# Patient Record
Sex: FEMALE | Race: OTHER | HISPANIC OR LATINO | Employment: UNEMPLOYED | URBAN - METROPOLITAN AREA
[De-identification: names, ages, dates, MRNs, and addresses within clinical notes are randomized per-mention and may not be internally consistent; named-entity substitution may affect disease eponyms.]

---

## 2018-10-19 LAB — EXTERNAL HIV SCREEN: NORMAL

## 2019-11-11 LAB — N GONORRHOEA RRNA SPEC QL PROBE: NEGATIVE

## 2019-11-20 RX ORDER — NORGESTIMATE AND ETHINYL ESTRADIOL 0.25-0.035
1 KIT ORAL DAILY
COMMUNITY
Start: 2019-08-21 | End: 2022-02-17 | Stop reason: ALTCHOICE

## 2020-02-22 ENCOUNTER — HOSPITAL ENCOUNTER (EMERGENCY)
Facility: HOSPITAL | Age: 25
Discharge: HOME | End: 2020-02-22
Attending: EMERGENCY MEDICINE | Admitting: EMERGENCY MEDICINE
Payer: COMMERCIAL

## 2020-02-22 VITALS
HEART RATE: 105 BPM | RESPIRATION RATE: 18 BRPM | SYSTOLIC BLOOD PRESSURE: 129 MMHG | WEIGHT: 180.78 LBS | OXYGEN SATURATION: 98 % | DIASTOLIC BLOOD PRESSURE: 77 MMHG | TEMPERATURE: 99.9 F

## 2020-02-22 DIAGNOSIS — J06.9 VIRAL UPPER RESPIRATORY ILLNESS: Primary | ICD-10-CM

## 2020-02-22 LAB
FLUAV RNA NPH QL NAA+PROBE: NORMAL
FLUBV RNA NPH QL NAA+PROBE: NORMAL
RSV RNA NPH QL NAA+PROBE: NORMAL
S PYO DNA THROAT QL NAA+PROBE: NORMAL

## 2020-02-22 PROCEDURE — 87651 STREP A DNA AMP PROBE: CPT | Performed by: PHYSICIAN ASSISTANT

## 2020-02-22 PROCEDURE — 96372 THER/PROPH/DIAG INJ SC/IM: CPT

## 2020-02-22 PROCEDURE — 99283 EMERGENCY DEPT VISIT LOW MDM: CPT

## 2020-02-22 PROCEDURE — 99283 EMERGENCY DEPT VISIT LOW MDM: CPT | Performed by: PHYSICIAN ASSISTANT

## 2020-02-22 PROCEDURE — 87631 RESP VIRUS 3-5 TARGETS: CPT | Performed by: PHYSICIAN ASSISTANT

## 2020-02-22 RX ORDER — ONDANSETRON 4 MG/1
4 TABLET, FILM COATED ORAL EVERY 8 HOURS PRN
Qty: 12 TABLET | Refills: 0 | Status: SHIPPED | OUTPATIENT
Start: 2020-02-22 | End: 2020-03-03 | Stop reason: ALTCHOICE

## 2020-02-22 RX ORDER — CETIRIZINE HYDROCHLORIDE, PSEUDOEPHEDRINE HYDROCHLORIDE 5; 120 MG/1; MG/1
1 TABLET, FILM COATED, EXTENDED RELEASE ORAL 2 TIMES DAILY
Qty: 30 TABLET | Refills: 0 | Status: SHIPPED | OUTPATIENT
Start: 2020-02-22 | End: 2020-03-03 | Stop reason: ALTCHOICE

## 2020-02-22 RX ORDER — IBUPROFEN 600 MG/1
600 TABLET ORAL EVERY 6 HOURS PRN
Qty: 30 TABLET | Refills: 0 | Status: SHIPPED | OUTPATIENT
Start: 2020-02-22 | End: 2020-03-03 | Stop reason: ALTCHOICE

## 2020-02-22 RX ORDER — KETOROLAC TROMETHAMINE 30 MG/ML
30 INJECTION, SOLUTION INTRAMUSCULAR; INTRAVENOUS ONCE
Status: COMPLETED | OUTPATIENT
Start: 2020-02-22 | End: 2020-02-22

## 2020-02-22 RX ORDER — ONDANSETRON 4 MG/1
4 TABLET, ORALLY DISINTEGRATING ORAL ONCE
Status: COMPLETED | OUTPATIENT
Start: 2020-02-22 | End: 2020-02-22

## 2020-02-22 RX ADMIN — ONDANSETRON 4 MG: 4 TABLET, ORALLY DISINTEGRATING ORAL at 18:05

## 2020-02-22 RX ADMIN — KETOROLAC TROMETHAMINE 30 MG: 30 INJECTION, SOLUTION INTRAMUSCULAR at 18:05

## 2020-02-22 NOTE — ED PROVIDER NOTES
History  Chief Complaint   Patient presents with    Flu Symptoms     HA with N/V and "fever" no meds taken today  started yesterday     Patient is a very pleasant 79-year-old female with no significant past medical history who presents today for evaluation of headache that started about 2 days ago  The patient reports that she also has associated nausea and vomiting  She reports she is able tolerate water  She states she has not taken any medications today  She reports subjective fevers  No abdominal pain  No pain with urination  She does not report significant cough  Prior to Admission Medications   Prescriptions Last Dose Informant Patient Reported? Taking?   norgestimate-ethinyl estradiol (ORTHO-CYCLEN) 0 25-35 MG-MCG per tablet   Yes No   Sig: Take 1 tablet by mouth daily      Facility-Administered Medications: None       History reviewed  No pertinent past medical history  History reviewed  No pertinent surgical history  History reviewed  No pertinent family history  I have reviewed and agree with the history as documented  Social History     Tobacco Use    Smoking status: Never Smoker    Smokeless tobacco: Never Used   Substance Use Topics    Alcohol use: Never     Frequency: Never    Drug use: Never       Review of Systems   Constitutional: Positive for activity change, appetite change and fever  HENT: Negative  Eyes: Negative  Respiratory: Negative  Cardiovascular: Negative  Gastrointestinal: Positive for nausea and vomiting  Endocrine: Negative  Genitourinary: Negative  Musculoskeletal: Negative  Skin: Negative  Allergic/Immunologic: Negative  Neurological: Negative  Hematological: Negative  Psychiatric/Behavioral: Negative  Physical Exam  Physical Exam   Constitutional: She is oriented to person, place, and time  She appears well-developed and well-nourished  HENT:   Head: Normocephalic and atraumatic     Right Ear: External ear normal    Left Ear: External ear normal    Mouth/Throat: Oropharynx is clear and moist  No oropharyngeal exudate  Noted rhinorrhea present  Eyes: Conjunctivae are normal    Neck: Normal range of motion  Cardiovascular: Normal rate, regular rhythm and normal heart sounds  Pulmonary/Chest: Effort normal and breath sounds normal    Abdominal: Soft  She exhibits no distension  There is no tenderness  There is no guarding  Lymphadenopathy:     She has cervical adenopathy  Neurological: She is alert and oriented to person, place, and time  Skin: Skin is warm  Capillary refill takes less than 2 seconds  Psychiatric: She has a normal mood and affect   Her behavior is normal  Judgment and thought content normal        Vital Signs  ED Triage Vitals   Temperature Pulse Respirations Blood Pressure SpO2   02/22/20 1739 02/22/20 1739 02/22/20 1739 02/22/20 1739 02/22/20 1739   99 9 °F (37 7 °C) (!) 134 18 129/77 98 %      Temp Source Heart Rate Source Patient Position - Orthostatic VS BP Location FiO2 (%)   02/22/20 1739 02/22/20 1739 02/22/20 1739 02/22/20 1739 --   Tympanic Monitor Sitting Left arm       Pain Score       02/22/20 1805       5           Vitals:    02/22/20 1739 02/22/20 1854   BP: 129/77    Pulse: (!) 134 105   Patient Position - Orthostatic VS: Sitting          Visual Acuity      ED Medications  Medications   ketorolac (TORADOL) injection 30 mg (30 mg Intramuscular Given 2/22/20 1805)   ondansetron (ZOFRAN-ODT) dispersible tablet 4 mg (4 mg Oral Given 2/22/20 1805)       Diagnostic Studies  Results Reviewed     Procedure Component Value Units Date/Time    Influenza A/B and RSV PCR [[id number]]  (Normal) Collected:  02/22/20 1757    Lab Status:  Final result Specimen:  Nares from Nose Updated:  02/22/20 1849     INFLUENZA A PCR None Detected     INFLUENZA B PCR None Detected     RSV PCR None Detected    Strep A PCR [[id number]]  (Normal) Collected:  02/22/20 1757    Lab Status:  Final result Specimen:  Throat Updated:  02/22/20 1848     STREP A PCR None Detected                 No orders to display              Procedures  Procedures         ED Course                               MDM  Number of Diagnoses or Management Options  Viral upper respiratory illness:   Diagnosis management comments: Patient reports significant symptom improvement with administration of Toradol and Zofran  She is able to tolerate p o  Intake  Influenza screen and strep a screen negative  Patient discharged home with recommendations to continue supportive care  Disposition  Final diagnoses:   Viral upper respiratory illness     Time reflects when diagnosis was documented in both MDM as applicable and the Disposition within this note     Time User Action Codes Description Comment    2/22/2020  6:51 PM Jazmine Wakefield Add [J06 9] Viral upper respiratory illness       ED Disposition     ED Disposition Condition Date/Time Comment    Discharge Stable Sat Feb 22, 2020  6:51 PM Delores Mari discharge to home/self care              Follow-up Information     Follow up With Specialties Details Why Live Wild MD Obstetrics and Gynecology, Obstetrics, Gynecology Schedule an appointment as soon as possible for a visit  If symptoms worsen [address] [place] [address]  [phone]            Discharge Medication List as of 2/22/2020  6:52 PM      START taking these medications    Details   cetirizine-pseudoephedrine (ZyrTEC-D) 5-120 MG per tablet Take 1 tablet by mouth 2 (two) times a day, Starting Sat 2/22/2020, Print      ibuprofen (MOTRIN) 600 mg tablet Take 1 tablet (600 mg total) by mouth every 6 (six) hours as needed for moderate pain, Starting Sat 2/22/2020, Print      ondansetron (ZOFRAN) 4 mg tablet Take 1 tablet (4 mg total) by mouth every 8 (eight) hours as needed for vomiting, Starting Sat 2/22/2020, Print         CONTINUE these medications which have NOT CHANGED    Details norgestimate-ethinyl estradiol (ORTHO-CYCLEN) 0 25-35 MG-MCG per tablet Take 1 tablet by mouth daily, Starting Wed 8/21/2019, Until Thu 8/20/2020, Historical Med           No discharge procedures on file      PDMP Review     None          ED Provider  Electronically Signed by           Mahad Yadav PA-C  02/22/20 2616

## 2020-02-28 RX ORDER — DOCUSATE SODIUM 100 MG/1
100 CAPSULE, LIQUID FILLED ORAL 2 TIMES DAILY
COMMUNITY
Start: 2019-02-07 | End: 2020-03-03 | Stop reason: ALTCHOICE

## 2020-03-03 ENCOUNTER — OFFICE VISIT (OUTPATIENT)
Dept: FAMILY MEDICINE CLINIC | Facility: CLINIC | Age: 25
End: 2020-03-03
Payer: COMMERCIAL

## 2020-03-03 ENCOUNTER — TELEPHONE (OUTPATIENT)
Dept: ADMINISTRATIVE | Facility: OTHER | Age: 25
End: 2020-03-03

## 2020-03-03 VITALS
BODY MASS INDEX: 32.32 KG/M2 | HEART RATE: 94 BPM | WEIGHT: 182.4 LBS | TEMPERATURE: 98.6 F | SYSTOLIC BLOOD PRESSURE: 130 MMHG | HEIGHT: 63 IN | OXYGEN SATURATION: 98 % | DIASTOLIC BLOOD PRESSURE: 90 MMHG

## 2020-03-03 DIAGNOSIS — Z23 NEED FOR HPV VACCINATION: ICD-10-CM

## 2020-03-03 DIAGNOSIS — K64.9 HEMORRHOIDS, UNSPECIFIED HEMORRHOID TYPE: ICD-10-CM

## 2020-03-03 DIAGNOSIS — Z13.220 SCREENING FOR CHOLESTEROL LEVEL: ICD-10-CM

## 2020-03-03 DIAGNOSIS — Z00.01 ENCOUNTER FOR WELL ADULT EXAM WITH ABNORMAL FINDINGS: ICD-10-CM

## 2020-03-03 DIAGNOSIS — R10.9 ABDOMINAL PAIN, UNSPECIFIED ABDOMINAL LOCATION: ICD-10-CM

## 2020-03-03 DIAGNOSIS — Z12.4 PAP SMEAR FOR CERVICAL CANCER SCREENING: ICD-10-CM

## 2020-03-03 DIAGNOSIS — Z13.1 ENCOUNTER FOR SCREENING EXAMINATION FOR IMPAIRED GLUCOSE REGULATION AND DIABETES MELLITUS: ICD-10-CM

## 2020-03-03 DIAGNOSIS — Z11.3 SCREENING FOR STD (SEXUALLY TRANSMITTED DISEASE): ICD-10-CM

## 2020-03-03 DIAGNOSIS — R31.9 HEMATURIA, UNSPECIFIED TYPE: ICD-10-CM

## 2020-03-03 DIAGNOSIS — R30.0 DYSURIA: Primary | ICD-10-CM

## 2020-03-03 DIAGNOSIS — Z11.4 ENCOUNTER FOR SCREENING FOR HIV: ICD-10-CM

## 2020-03-03 LAB
SL AMB  POCT GLUCOSE, UA: ABNORMAL
SL AMB LEUKOCYTE ESTERASE,UA: ABNORMAL
SL AMB POCT BILIRUBIN,UA: ABNORMAL
SL AMB POCT BLOOD,UA: ABNORMAL
SL AMB POCT CLARITY,UA: ABNORMAL
SL AMB POCT COLOR,UA: YELLOW
SL AMB POCT KETONES,UA: ABNORMAL
SL AMB POCT NITRITE,UA: ABNORMAL
SL AMB POCT PH,UA: 5
SL AMB POCT SPECIFIC GRAVITY,UA: 1.02
SL AMB POCT URINE PROTEIN: ABNORMAL
SL AMB POCT UROBILINOGEN: 0.2

## 2020-03-03 PROCEDURE — 87077 CULTURE AEROBIC IDENTIFY: CPT | Performed by: NURSE PRACTITIONER

## 2020-03-03 PROCEDURE — 1036F TOBACCO NON-USER: CPT | Performed by: NURSE PRACTITIONER

## 2020-03-03 PROCEDURE — 87186 SC STD MICRODIL/AGAR DIL: CPT | Performed by: NURSE PRACTITIONER

## 2020-03-03 PROCEDURE — 90471 IMMUNIZATION ADMIN: CPT | Performed by: NURSE PRACTITIONER

## 2020-03-03 PROCEDURE — 81002 URINALYSIS NONAUTO W/O SCOPE: CPT | Performed by: NURSE PRACTITIONER

## 2020-03-03 PROCEDURE — 90651 9VHPV VACCINE 2/3 DOSE IM: CPT | Performed by: NURSE PRACTITIONER

## 2020-03-03 PROCEDURE — 87086 URINE CULTURE/COLONY COUNT: CPT | Performed by: NURSE PRACTITIONER

## 2020-03-03 PROCEDURE — 3008F BODY MASS INDEX DOCD: CPT | Performed by: NURSE PRACTITIONER

## 2020-03-03 PROCEDURE — 99203 OFFICE O/P NEW LOW 30 MIN: CPT | Performed by: NURSE PRACTITIONER

## 2020-03-03 RX ORDER — NITROFURANTOIN 25; 75 MG/1; MG/1
100 CAPSULE ORAL 2 TIMES DAILY
Qty: 10 CAPSULE | Refills: 0 | Status: SHIPPED | OUTPATIENT
Start: 2020-03-03 | End: 2020-03-08

## 2020-03-03 NOTE — ASSESSMENT & PLAN NOTE
-POCT with leukocytes and burning on urination  I am starting patient on antibiotics today  Advised to increase fluid intake  Practice good hygiene  Use of cotton underwear and urinate post sexual intercourse  Sending out Urine culture

## 2020-03-03 NOTE — TELEPHONE ENCOUNTER
----- Message from Monique Velazquez, Em Caputo sent at 3/3/2020  2:17 PM EST -----  Regarding: hiv   03/03/20 2:17 PM    Jeanie, our patient Yanique Carey has had HIV completed/performed  Please assist in updating the patient chart by pulling the Care Everywhere (CE) document  The date of service is 10/19/18       Thank you,  Monique Velazquez MA   [address]

## 2020-03-03 NOTE — TELEPHONE ENCOUNTER
----- Message from Deandre Richardson, [address] Caputo sent at 3/3/2020  2:18 PM EST -----  03/03/20 2:18 PM    Hello, our patient Pierre Spain has had Chlamydia completed/performed  Please assist in updating the patient chart by pulling the Care Everywhere (CE) document  The date of service is 11/11/19       Thank you,  Deandre Richardson MA   [address]

## 2020-03-03 NOTE — PROGRESS NOTES
Assessment/Plan:    Dysuria  -POCT with leukocytes and burning on urination  I am starting patient on antibiotics today  Advised to increase fluid intake  Practice good hygiene  Use of cotton underwear and urinate post sexual intercourse  Sending out Urine culture  Hematuria  -Ordering US kidney    Hemorrhoids  I also advised to increase fluid intake to 2 L/day and incorporated more prunes in diet  I also am recommending patient exercise more frequently and increase fiber in diet  Avoid straining during bowel movements or heavy lifting  Practice good body mechanics  Rx for Anusol HC 2 5% given to patient today  To follow up if s/s do not resolve  BMI Counseling: Body mass index is 32 31 kg/m²  The BMI is above normal  Nutrition recommendations include reducing portion sizes, 3-5 servings of fruits/vegetables daily, decreasing soda and/or juice intake and moderation in carbohydrate intake  Exercise recommendations include moderate aerobic physical activity for 150 minutes/week  Diagnoses and all orders for this visit:    Dysuria  -     POCT urine dip  -     nitrofurantoin (MACROBID) 100 mg capsule; Take 1 capsule (100 mg total) by mouth 2 (two) times a day for 5 days  -     Urine culture    Hematuria, unspecified type  -     US retroperitoneal complete; Future    Hemorrhoids, unspecified hemorrhoid type  -     hydrocortisone (ANUSOL-HC, PROCTOSOL HC,) 2 5 % rectal cream; Insert into the rectum 2 (two) times a day  -     witch hazel-glycerin (TUCKS) topical pad; Insert 1 pad into the rectum as needed for irritation or hemorrhoids    Encounter for screening for HIV    Pap smear for cervical cancer screening  -     Ambulatory referral to Obstetrics / Gynecology; Future    Screening for cholesterol level  -     Lipid Panel with Direct LDL reflex; Future    Encounter for screening examination for impaired glucose regulation and diabetes mellitus  -     Comprehensive metabolic panel;  Future    Encounter for well adult exam with abnormal findings  -     CBC and differential; Future    Abdominal pain, unspecified abdominal location  -     POCT urine dip    Screening for STD (sexually transmitted disease)  -     Chlamydia/GC amplified DNA by PCR  -     Hepatitis panel, acute  -     RPR  -     Cancel: Molecular Vaginal Panel    Other orders  -     Discontinue: docusate sodium (Colace) 100 mg capsule; Take 100 mg by mouth 2 (two) times a day  -     Cancel: TSH, 3rd generation with Free T4 reflex; Future  -     Cancel: Chlamydia/GC amplified DNA by PCR; Future  -     Cancel: Ambulatory referral to Obstetrics / Gynecology; Future  -     Cancel: HIV 1/2 AG-AB combo; Future  -     Cancel: Sureswab(R) Bacterial Vaginosis DNA, Qn, PCR; Future          Subjective:      Patient ID: Fran Swain is a 27 y o  female  27year old female patient here to establish care  PMHx  States after having vaginal delivery had hemorrhoids  States when stooling she has bleeding from her rectum  States she is seeing blood in the toilet and on the paper when cleaning  Denies any itching in the anal area  Has not felt any hemorrhoids in the rectum  Urinary Frequency    This is a recurrent problem  The current episode started more than 1 year ago  The problem occurs intermittently  The problem has been waxing and waning  The quality of the pain is described as burning  The pain is at a severity of 7/10  The pain is moderate  There has been no fever  The fever has been present for less than 1 day  She is sexually active  There is no history of pyelonephritis  Associated symptoms include frequency and urgency  Pertinent negatives include no discharge, nausea or possible pregnancy  She has tried nothing for the symptoms  The treatment provided no relief  There is no history of catheterization, kidney stones, recurrent UTIs, a single kidney, urinary stasis or a urological procedure         The following portions of the patient's history were reviewed and updated as appropriate: allergies, current medications, past family history, past medical history, past social history, past surgical history and problem list     Review of Systems   Constitutional: Negative  Negative for appetite change, fatigue and fever  HENT: Negative  Eyes: Negative  Respiratory: Negative  Negative for cough, chest tightness, shortness of breath and wheezing  Cardiovascular: Negative  Negative for chest pain and palpitations  Gastrointestinal: Negative  Negative for nausea  Endocrine: Negative  Genitourinary: Positive for frequency and urgency  Musculoskeletal: Negative  Negative for arthralgias and gait problem  Skin: Negative  Allergic/Immunologic: Negative  Neurological: Negative  Negative for dizziness and headaches  Hematological: Negative  Negative for adenopathy  Does not bruise/bleed easily  Psychiatric/Behavioral: Negative  Objective:      /90 (BP Location: Left arm, Patient Position: Sitting, Cuff Size: Adult)   Pulse 94   Temp 98 6 °F (37 °C) (Oral)   Ht 5' 3" (1 6 m)   Wt 82 7 kg (182 lb 6 4 oz)   LMP 02/08/2020 (Exact Date)   SpO2 98%   BMI 32 31 kg/m²          Physical Exam   Constitutional: She is oriented to person, place, and time  She appears well-developed and well-nourished  No distress  HENT:   Head: Normocephalic and atraumatic  Right Ear: External ear normal    Left Ear: External ear normal    Eyes: Pupils are equal, round, and reactive to light  EOM are normal    Neck: Normal range of motion  Neck supple  Cardiovascular: Normal rate, regular rhythm and normal heart sounds  Pulmonary/Chest: Effort normal and breath sounds normal  No respiratory distress  She has no wheezes  She has no rales  Abdominal: Soft  Bowel sounds are normal  She exhibits no distension  There is no tenderness  There is no rigidity, no rebound, no guarding, no CVA tenderness and negative Matthews's sign  Genitourinary: Rectal exam shows external hemorrhoid  Pelvic exam was performed with patient prone  Musculoskeletal: Normal range of motion  Lymphadenopathy:     She has no cervical adenopathy  Neurological: She is alert and oriented to person, place, and time  She has normal reflexes  Skin: Skin is warm and dry  Capillary refill takes less than 2 seconds  She is not diaphoretic  Psychiatric: She has a normal mood and affect  Her behavior is normal    Nursing note and vitals reviewed

## 2020-03-03 NOTE — TELEPHONE ENCOUNTER
----- Message from Donny Velazquez sent at 3/3/2020  2:20 PM EST -----  Regarding: pap  03/03/20 2:20 PM    Hello, our patient Pierre Spain has had Pap Smear (HPV) aka Cervical Cancer Screening completed/performed  Please assist in updating the patient chart by pulling the Care Everywhere (CE) document  The date of service is 10/18/18       Thank you,  Deandre Richardson MA   [address] [facility] [address]

## 2020-03-03 NOTE — TELEPHONE ENCOUNTER
Upon review of the In Basket request we were able to locate, review, and update the patient chart as requested for Chlamydia, HIV and Pap Smear (HPV) aka Cervical Cancer Screening  Any additional questions or concerns should be emailed to the Practice Liaisons via [email or web address] email, please do not reply via In Basket      Thank you  oRshan Heller

## 2020-03-03 NOTE — ASSESSMENT & PLAN NOTE
I also advised to increase fluid intake to 2 L/day and incorporated more prunes in diet  I also am recommending patient exercise more frequently and increase fiber in diet  Avoid straining during bowel movements or heavy lifting  Practice good body mechanics  Rx for Anusol HC 2 5% given to patient today  To follow up if s/s do not resolve

## 2020-03-04 ENCOUNTER — HOSPITAL ENCOUNTER (OUTPATIENT)
Dept: ULTRASOUND IMAGING | Facility: HOSPITAL | Age: 25
Discharge: HOME | End: 2020-03-04
Payer: COMMERCIAL

## 2020-03-04 DIAGNOSIS — R31.9 HEMATURIA, UNSPECIFIED TYPE: ICD-10-CM

## 2020-03-04 PROCEDURE — 76770 US EXAM ABDO BACK WALL COMP: CPT

## 2020-03-06 LAB
BACTERIA UR CULT: ABNORMAL
BACTERIA UR CULT: ABNORMAL

## 2020-03-07 ENCOUNTER — APPOINTMENT (OUTPATIENT)
Dept: LAB | Facility: HOSPITAL | Age: 25
End: 2020-03-07
Payer: COMMERCIAL

## 2020-03-07 DIAGNOSIS — Z13.1 ENCOUNTER FOR SCREENING EXAMINATION FOR IMPAIRED GLUCOSE REGULATION AND DIABETES MELLITUS: ICD-10-CM

## 2020-03-07 DIAGNOSIS — Z00.01 ENCOUNTER FOR WELL ADULT EXAM WITH ABNORMAL FINDINGS: ICD-10-CM

## 2020-03-07 DIAGNOSIS — Z13.220 SCREENING FOR CHOLESTEROL LEVEL: ICD-10-CM

## 2020-03-07 LAB
ALBUMIN SERPL BCP-MCNC: 4.3 G/DL (ref 3–5.2)
ALP SERPL-CCNC: 67 U/L (ref 43–122)
ALT SERPL W P-5'-P-CCNC: 29 U/L (ref 9–52)
ANION GAP SERPL CALCULATED.3IONS-SCNC: 7 MMOL/L (ref 5–14)
AST SERPL W P-5'-P-CCNC: 22 U/L (ref 14–36)
BASOPHILS # BLD AUTO: 0.1 THOUSANDS/ΜL (ref 0–0.1)
BASOPHILS NFR BLD AUTO: 1 % (ref 0–1)
BILIRUB SERPL-MCNC: 1.1 MG/DL
BUN SERPL-MCNC: 13 MG/DL (ref 5–25)
CALCIUM SERPL-MCNC: 9.3 MG/DL (ref 8.4–10.2)
CHLORIDE SERPL-SCNC: 106 MMOL/L (ref 97–108)
CHOLEST SERPL-MCNC: 168 MG/DL
CO2 SERPL-SCNC: 28 MMOL/L (ref 22–30)
CREAT SERPL-MCNC: 0.82 MG/DL (ref 0.6–1.2)
EOSINOPHIL # BLD AUTO: 0.3 THOUSAND/ΜL (ref 0–0.4)
EOSINOPHIL NFR BLD AUTO: 4 % (ref 0–6)
ERYTHROCYTE [DISTWIDTH] IN BLOOD BY AUTOMATED COUNT: 12.8 %
GFR SERPL CREATININE-BSD FRML MDRD: 102 ML/MIN/1.73SQ M
GLUCOSE P FAST SERPL-MCNC: 87 MG/DL (ref 70–99)
HAV IGM SER QL: NORMAL
HBV CORE IGM SER QL: NORMAL
HBV SURFACE AG SER QL: NORMAL
HCT VFR BLD AUTO: 38.8 % (ref 36–46)
HCV AB SER QL: NORMAL
HDLC SERPL-MCNC: 40 MG/DL
HGB BLD-MCNC: 13.1 G/DL (ref 12–16)
LDLC SERPL CALC-MCNC: 107 MG/DL
LYMPHOCYTES # BLD AUTO: 1.9 THOUSANDS/ΜL (ref 0.5–4)
LYMPHOCYTES NFR BLD AUTO: 32 % (ref 25–45)
MCH RBC QN AUTO: 29.9 PG (ref 26–34)
MCHC RBC AUTO-ENTMCNC: 33.8 G/DL (ref 31–36)
MCV RBC AUTO: 88 FL (ref 80–100)
MONOCYTES # BLD AUTO: 0.5 THOUSAND/ΜL (ref 0.2–0.9)
MONOCYTES NFR BLD AUTO: 8 % (ref 1–10)
NEUTROPHILS # BLD AUTO: 3.4 THOUSANDS/ΜL (ref 1.8–7.8)
NEUTS SEG NFR BLD AUTO: 55 % (ref 45–65)
PLATELET # BLD AUTO: 387 THOUSANDS/UL (ref 150–450)
PMV BLD AUTO: 8 FL (ref 8.9–12.7)
POTASSIUM SERPL-SCNC: 4.2 MMOL/L (ref 3.6–5)
PROT SERPL-MCNC: 7.9 G/DL (ref 5.9–8.4)
RBC # BLD AUTO: 4.39 MILLION/UL (ref 4–5.2)
SODIUM SERPL-SCNC: 141 MMOL/L (ref 137–147)
TRIGL SERPL-MCNC: 103 MG/DL
WBC # BLD AUTO: 6.1 THOUSAND/UL (ref 4.5–11)

## 2020-03-07 PROCEDURE — 80061 LIPID PANEL: CPT

## 2020-03-07 PROCEDURE — 85025 COMPLETE CBC W/AUTO DIFF WBC: CPT

## 2020-03-07 PROCEDURE — 36415 COLL VENOUS BLD VENIPUNCTURE: CPT | Performed by: NURSE PRACTITIONER

## 2020-03-07 PROCEDURE — 87591 N.GONORRHOEAE DNA AMP PROB: CPT | Performed by: NURSE PRACTITIONER

## 2020-03-07 PROCEDURE — 80053 COMPREHEN METABOLIC PANEL: CPT

## 2020-03-07 PROCEDURE — 87491 CHLMYD TRACH DNA AMP PROBE: CPT | Performed by: NURSE PRACTITIONER

## 2020-03-07 PROCEDURE — 80074 ACUTE HEPATITIS PANEL: CPT | Performed by: NURSE PRACTITIONER

## 2020-03-07 PROCEDURE — 86592 SYPHILIS TEST NON-TREP QUAL: CPT | Performed by: NURSE PRACTITIONER

## 2020-03-09 ENCOUNTER — TELEPHONE (OUTPATIENT)
Dept: FAMILY MEDICINE CLINIC | Facility: CLINIC | Age: 25
End: 2020-03-09

## 2020-03-09 LAB
C TRACH DNA SPEC QL NAA+PROBE: NEGATIVE
N GONORRHOEA DNA SPEC QL NAA+PROBE: NEGATIVE
RPR SER QL: NORMAL

## 2020-03-09 NOTE — TELEPHONE ENCOUNTER
Conrad from radiology at [facility] called with significant finding In patients us please review

## 2020-03-11 DIAGNOSIS — N28.1 ACQUIRED COMPLEX CYST OF KIDNEY: Primary | ICD-10-CM

## 2020-04-12 ENCOUNTER — HOSPITAL ENCOUNTER (OUTPATIENT)
Dept: MRI IMAGING | Facility: HOSPITAL | Age: 25
Discharge: HOME | End: 2020-04-12
Payer: COMMERCIAL

## 2020-04-12 DIAGNOSIS — N28.1 ACQUIRED COMPLEX CYST OF KIDNEY: ICD-10-CM

## 2020-04-12 PROCEDURE — A9585 GADOBUTROL INJECTION: HCPCS | Performed by: NURSE PRACTITIONER

## 2020-04-12 PROCEDURE — 74183 MRI ABD W/O CNTR FLWD CNTR: CPT

## 2020-04-12 RX ADMIN — GADOBUTROL 8 ML: 604.72 INJECTION INTRAVENOUS at 10:51

## 2020-09-22 ENCOUNTER — TELEMEDICINE (OUTPATIENT)
Dept: FAMILY MEDICINE CLINIC | Facility: CLINIC | Age: 25
End: 2020-09-22
Payer: COMMERCIAL

## 2020-09-22 DIAGNOSIS — Z20.822 EXPOSURE TO COVID-19 VIRUS: Primary | ICD-10-CM

## 2020-09-22 DIAGNOSIS — Z20.822 EXPOSURE TO COVID-19 VIRUS: ICD-10-CM

## 2020-09-22 DIAGNOSIS — R19.7 DIARRHEA, UNSPECIFIED TYPE: ICD-10-CM

## 2020-09-22 DIAGNOSIS — R11.0 NAUSEA: ICD-10-CM

## 2020-09-22 PROCEDURE — U0003 INFECTIOUS AGENT DETECTION BY NUCLEIC ACID (DNA OR RNA); SEVERE ACUTE RESPIRATORY SYNDROME CORONAVIRUS 2 (SARS-COV-2) (CORONAVIRUS DISEASE [COVID-19]), AMPLIFIED PROBE TECHNIQUE, MAKING USE OF HIGH THROUGHPUT TECHNOLOGIES AS DESCRIBED BY CMS-2020-01-R: HCPCS | Performed by: NURSE PRACTITIONER

## 2020-09-22 PROCEDURE — 99213 OFFICE O/P EST LOW 20 MIN: CPT | Performed by: NURSE PRACTITIONER

## 2020-09-22 RX ORDER — LOPERAMIDE HYDROCHLORIDE 2 MG/1
2 CAPSULE ORAL 4 TIMES DAILY PRN
Qty: 30 CAPSULE | Refills: 0 | Status: SHIPPED | OUTPATIENT
Start: 2020-09-22

## 2020-09-22 RX ORDER — ONDANSETRON 8 MG/1
8 TABLET, ORALLY DISINTEGRATING ORAL EVERY 8 HOURS PRN
Qty: 20 TABLET | Refills: 0 | Status: SHIPPED | OUTPATIENT
Start: 2020-09-22 | End: 2020-09-29

## 2020-09-22 NOTE — ASSESSMENT & PLAN NOTE
We discussed the main goals of treatment are to prevent and treat volume depletion, maintain nutrition, and reduce spread to other people  I recommended patient keep hydrated and increase fluid intake like Gatorade  I also advised to avoid fluids high in sugar to avoid worsening diarrhea  I explained to patient that routine use of antibiotics is not recommended  Practice of good hygiene and disinfecting home as this can easily spread  Anti-diarrheal medications prescribed to patient  If s/s do not improve I advised to return to clinic

## 2020-09-22 NOTE — PROGRESS NOTES
Virtual Regular Visit      Assessment/Plan:    Problem List Items Addressed This Visit        Other    Exposure to COVID-19 virus - Primary     I advised patient that they  should quarantine 14 days and stay in their home and have contact with as few people as possible   They should stay home unless medically necessary  They  should not attend school/work with other people or public gatherings  A medical letter will be provided to patient for time off  Employers have been advised to be considerate if an employee needs to be quarantined  Patient was recommended not to attend gatherings, meetings or areas where people come together  They also should not have any visitors while on quarantine to avoid spread if suspicion of COVID  Patient should monitor their symptoms and take their temperature everyday and report any fever or new symptoms to PCP  Patient can take OTC medications as patient has like Tylenol and practice conservative measures while at home in quarantine  Stressed importance of good hand hygiene practices and avoid sharing any personal items with other household members  Practicing good disinfection as well  Pt in agreement  Relevant Orders    Novel Coronavirus (COVID-19), PCR [facility] - Collected at [employer] or [facility]    Diarrhea     We discussed the main goals of treatment are to prevent and treat volume depletion, maintain nutrition, and reduce spread to other people  I recommended patient keep hydrated and increase fluid intake like Gatorade  I also advised to avoid fluids high in sugar to avoid worsening diarrhea  I explained to patient that routine use of antibiotics is not recommended  Practice of good hygiene and disinfecting home as this can easily spread  Anti-diarrheal medications prescribed to patient  If s/s do not improve I advised to return to clinic           Relevant Medications    loperamide (IMODIUM) 2 mg capsule    Nausea    Relevant Medications    ondansetron (ZOFRAN-ODT) 8 mg disintegrating tablet               Reason for visit is   Chief Complaint   Patient presents with    Virtual Regular Visit        Encounter provider SOFIA Quinones    Provider [facility] at [facility] AT [address]  [address] [place] [address]      Recent Visits  No visits were found meeting these conditions  Showing recent visits within past 7 days and meeting all other requirements     Today's Visits  Date Type Provider Dept   09/22/20 Telemedicine Liliana Mcfarland, [address] today's visits and meeting all other requirements     Future Appointments  No visits were found meeting these conditions  Showing future appointments within next 150 days and meeting all other requirements        The patient was identified by name and date of birth  Denita Daugherty was informed that this is a telemedicine visit and that the visit is being conducted through [address] and patient was informed that this is not a secure, HIPAA-complaint platform  She agrees to proceed     My office door was closed  No one else was in the room  She acknowledged consent and understanding of privacy and security of the video platform  The patient has agreed to participate and understands they can discontinue the visit at any time  Patient is aware this is a billable service  Subjective  Denita Daugherty is a 23 y o  female presents with abdominal pain for a few days approximately 1 week  Admits to diarrhea per patient as well  Denies any suspicious food  Denies any sick contacts per patient  Works in a factory per patient  Unsure if anyone is sick with COVID  Denies fever, cough, SOB  Also admits to nausea and felt a sharp pain in her chest that went away after a few minutes  HPI     No past medical history on file  No past surgical history on file      Current Outpatient Medications Medication Sig Dispense Refill    hydrocortisone (ANUSOL-HC, PROCTOSOL HC,) 2 5 % rectal cream Insert into the rectum 2 (two) times a day 30 g 0    loperamide (IMODIUM) 2 mg capsule Take 1 capsule (2 mg total) by mouth 4 (four) times a day as needed for diarrhea 30 capsule 0    norgestimate-ethinyl estradiol (ORTHO-CYCLEN) 0 25-35 MG-MCG per tablet Take 1 tablet by mouth daily      ondansetron (ZOFRAN-ODT) 8 mg disintegrating tablet Take 1 tablet (8 mg total) by mouth every 8 (eight) hours as needed for nausea or vomiting for up to 20 doses 20 tablet 0    witch hazel-glycerin (TUCKS) topical pad Insert 1 pad into the rectum as needed for irritation or hemorrhoids 100 each 1     No current facility-administered medications for this visit  No Known Allergies    Review of Systems   Constitutional: Negative  Negative for appetite change, fatigue and fever  HENT: Negative  Negative for congestion, sinus pressure, sneezing and sore throat  Eyes: Negative  Respiratory: Negative  Negative for cough, chest tightness, shortness of breath and wheezing  Cardiovascular: Positive for chest pain (sharp pains x 1 episodes that subsided in minutes)  Negative for palpitations  Gastrointestinal: Positive for abdominal pain, diarrhea and nausea  Endocrine: Negative  Genitourinary: Negative  Musculoskeletal: Negative  Negative for arthralgias  Skin: Negative  Allergic/Immunologic: Negative  Neurological: Negative  Negative for dizziness and headaches  Hematological: Negative  Psychiatric/Behavioral: Negative  Video Exam    There were no vitals filed for this visit  Physical Exam  Constitutional:       Appearance: Normal appearance  HENT:      Head: Normocephalic and atraumatic  Right Ear: External ear normal       Left Ear: External ear normal       Nose: Nose normal       Mouth/Throat:      Mouth: Mucous membranes are moist       Pharynx: Oropharynx is clear  Eyes:      Conjunctiva/sclera: Conjunctivae normal       Pupils: Pupils are equal, round, and reactive to light  Neck:      Musculoskeletal: Normal range of motion and neck supple  Cardiovascular:      Rate and Rhythm: Normal rate and regular rhythm  Pulses: Normal pulses  Heart sounds: Normal heart sounds  Pulmonary:      Effort: Pulmonary effort is normal       Breath sounds: Normal breath sounds  Abdominal:      General: There is no distension  Palpations: There is no mass  Tenderness: There is no abdominal tenderness  Hernia: No hernia is present  Musculoskeletal: Normal range of motion  Skin:     General: Skin is warm and dry  Capillary Refill: Capillary refill takes less than 2 seconds  Neurological:      Mental Status: She is alert and oriented to person, place, and time  Psychiatric:         Mood and Affect: Mood normal          Behavior: Behavior normal           I spent 15 minutes directly with the patient during this visit      VIRTUAL VISIT DISCLAIMER    Carlo Belcher acknowledges that she has consented to an online visit or consultation  She understands that the online visit is based solely on information provided by her, and that, in the absence of a face-to-face physical evaluation by the physician, the diagnosis she receives is both limited and provisional in terms of accuracy and completeness  This is not intended to replace a full medical face-to-face evaluation by the physician  Carlo Belcher understands and accepts these terms

## 2020-09-22 NOTE — ASSESSMENT & PLAN NOTE
I advised patient that they  should quarantine 14 days and stay in their home and have contact with as few people as possible   They should stay home unless medically necessary  They  should not attend school/work with other people or public gatherings  A medical letter will be provided to patient for time off  Employers have been advised to be considerate if an employee needs to be quarantined  Patient was recommended not to attend gatherings, meetings or areas where people come together  They also should not have any visitors while on quarantine to avoid spread if suspicion of COVID  Patient should monitor their symptoms and take their temperature everyday and report any fever or new symptoms to PCP  Patient can take OTC medications as patient has like Tylenol and practice conservative measures while at home in quarantine  Stressed importance of good hand hygiene practices and avoid sharing any personal items with other household members  Practicing good disinfection as well  Pt in agreement

## 2020-09-23 LAB — SARS-COV-2 RNA SPEC QL NAA+PROBE: NOT DETECTED

## 2022-05-11 ENCOUNTER — TELEPHONE (OUTPATIENT)
Dept: FAMILY MEDICINE CLINIC | Facility: CLINIC | Age: 27
End: 2022-05-11

## 2022-05-11 NOTE — LETTER
May 12, 2022     Patient:  Blayne Kurtz  YOB: 1995  Date of Visit: 5/11/2022        Dear Jorge Alberto Keller,    Please contact the office to schedule your annual physical         Kourtney BLACK      CC: No Recipients

## 2024-08-15 ENCOUNTER — RA CDI HCC (OUTPATIENT)
Dept: OTHER | Facility: HOSPITAL | Age: 29
End: 2024-08-15

## 2024-08-22 ENCOUNTER — TELEPHONE (OUTPATIENT)
Age: 29
End: 2024-08-22

## 2024-08-22 ENCOUNTER — TELEPHONE (OUTPATIENT)
Dept: ADMINISTRATIVE | Facility: OTHER | Age: 29
End: 2024-08-22

## 2024-08-22 NOTE — TELEPHONE ENCOUNTER
----- Message from Maria Eugenia DEL REAL sent at 8/22/2024 10:02 AM EDT -----  Regarding: pap  08/22/24 10:03 AM    Hello, our patient Isaías Almonte has had Pap Smear (HPV) aka Cervical Cancer Screening completed/performed. Please assist in updating the patient chart by pulling the Care Everywhere (CE) document. The date of service is 2023.     Thank you,  Maria Eugenia Horowitz RN  [facility]  PRIMARY [facility]

## 2024-08-22 NOTE — TELEPHONE ENCOUNTER
Pt called in stating she now having this new Government insurance Id Number [id number] - Medicare or Medicaid she is not sure. Wanted to check if she can use this for her upcoming appt. Please call back the patient was on hold with the practice for quite long. Thanks

## 2024-08-22 NOTE — TELEPHONE ENCOUNTER
Pt called unable to contact [facility]  rules are if pt in  not being seen in three years is considered a new pt, office is not currently accepted medicaid for new pt

## 2024-08-22 NOTE — TELEPHONE ENCOUNTER
I explained the previous to the pt and referred her to [facility] and also cancelled her appt.

## 2024-08-23 NOTE — TELEPHONE ENCOUNTER
Upon review of the In Basket request we were able to locate, review, and update the patient chart as requested for Pap Smear (HPV) aka Cervical Cancer Screening.    Any additional questions or concerns should be emailed to the Practice Liaisons via the appropriate education email address, please do not reply via In Basket.    Thank you  Rosaura Murphy MA   [facility]